# Patient Record
Sex: FEMALE | Race: WHITE | NOT HISPANIC OR LATINO
[De-identification: names, ages, dates, MRNs, and addresses within clinical notes are randomized per-mention and may not be internally consistent; named-entity substitution may affect disease eponyms.]

---

## 2024-07-24 ENCOUNTER — RESULT REVIEW (OUTPATIENT)
Age: 79
End: 2024-07-24

## 2024-07-26 ENCOUNTER — TRANSCRIPTION ENCOUNTER (OUTPATIENT)
Age: 79
End: 2024-07-26

## 2024-07-30 PROBLEM — Z00.00 ENCOUNTER FOR PREVENTIVE HEALTH EXAMINATION: Status: ACTIVE | Noted: 2024-07-30

## 2024-07-31 ENCOUNTER — APPOINTMENT (OUTPATIENT)
Dept: INTERNAL MEDICINE | Facility: CLINIC | Age: 79
End: 2024-07-31
Payer: MEDICARE

## 2024-07-31 VITALS
WEIGHT: 132 LBS | TEMPERATURE: 98 F | SYSTOLIC BLOOD PRESSURE: 169 MMHG | HEART RATE: 61 BPM | DIASTOLIC BLOOD PRESSURE: 69 MMHG | RESPIRATION RATE: 16 BRPM | BODY MASS INDEX: 21.99 KG/M2 | HEIGHT: 65 IN | OXYGEN SATURATION: 98 %

## 2024-07-31 DIAGNOSIS — E87.1 HYPO-OSMOLALITY AND HYPONATREMIA: ICD-10-CM

## 2024-07-31 DIAGNOSIS — I10 ESSENTIAL (PRIMARY) HYPERTENSION: ICD-10-CM

## 2024-07-31 PROCEDURE — 99496 TRANSJ CARE MGMT HIGH F2F 7D: CPT

## 2024-07-31 RX ORDER — LABETALOL HYDROCHLORIDE 200 MG/1
200 TABLET, FILM COATED ORAL
Qty: 60 | Refills: 0 | Status: ACTIVE | COMMUNITY
Start: 2024-07-31 | End: 1900-01-01

## 2024-07-31 RX ORDER — LABETALOL HYDROCHLORIDE 100 MG/1
100 TABLET, FILM COATED ORAL
Refills: 0 | Status: ACTIVE | COMMUNITY

## 2024-07-31 RX ORDER — AMLODIPINE BESYLATE 10 MG/1
10 TABLET ORAL
Refills: 0 | Status: ACTIVE | COMMUNITY

## 2024-08-02 PROBLEM — E87.1 HYPONATREMIA: Status: ACTIVE | Noted: 2024-08-02

## 2024-08-02 NOTE — PHYSICAL EXAM
[Normal] : normal rate, regular rhythm, normal S1 and S2 and no murmur heard [de-identified] : +1 pitting edema LE b/l

## 2024-08-02 NOTE — ASSESSMENT
[FreeTextEntry1] : Hyponatremia attributed to tea and toast diet amplified by diuretic use - improving had home blood draw today - educated on adequate salt diet and avoiding diuretics  HTN - uncontrolled HTN - c/w amlodipine - inc labetolol 100mg to 200mg

## 2024-08-02 NOTE — PHYSICAL EXAM
[Normal] : normal rate, regular rhythm, normal S1 and S2 and no murmur heard [de-identified] : +1 pitting edema LE b/l

## 2024-08-02 NOTE — HISTORY OF PRESENT ILLNESS
[Post-hospitalization from ___ Hospital] : Post-hospitalization from [unfilled] Hospital [FreeTextEntry2] : 78F PMH HTN presenting with nausea, vomiting and generalized weakness. Pt was lethargic and complaining of a headache. She had 7-8 episodes of small volume, watery emesis throughout the day and did not eat or drink anything all day due to the nausea. Pt has been on losartan-HCTZ for the last few years and was d/c on admission to the ICU for hyponatremia 2/2 to hypovolemia. Admitting Na was 117 and is being replenished with NS (75cc/hr). Pt sodium was constant between 117-119 and salt tabs were started TID and incrased to 2g with improvement in sodium. She was initially NPO but then started on a regular diet and tolerated it well. Pt had right thigh leg cramps and a lidocaine patch was placed. Her sodium is corrected at 129. Pt is ambulatory. Hypokalemia was corrected with Kphos and lactate is back at baseline. Patients blood pressure medications were changed from Losartan-HCTZ to Labetalol and Amlodipine. Pt presently feels well in office but would like to restart diuretic as she was taking it to dec leg swelling and has noticed her legs are more swollen.

## 2024-08-02 NOTE — PHYSICAL EXAM
[Normal] : normal rate, regular rhythm, normal S1 and S2 and no murmur heard [de-identified] : +1 pitting edema LE b/l

## 2024-08-28 ENCOUNTER — APPOINTMENT (OUTPATIENT)
Dept: INTERNAL MEDICINE | Facility: CLINIC | Age: 79
End: 2024-08-28